# Patient Record
Sex: FEMALE | Race: BLACK OR AFRICAN AMERICAN | NOT HISPANIC OR LATINO | URBAN - METROPOLITAN AREA
[De-identification: names, ages, dates, MRNs, and addresses within clinical notes are randomized per-mention and may not be internally consistent; named-entity substitution may affect disease eponyms.]

---

## 2018-10-08 ENCOUNTER — EMERGENCY (EMERGENCY)
Facility: HOSPITAL | Age: 43
LOS: 1 days | Discharge: HOME | End: 2018-10-08
Attending: EMERGENCY MEDICINE | Admitting: EMERGENCY MEDICINE
Payer: COMMERCIAL

## 2018-10-08 VITALS
SYSTOLIC BLOOD PRESSURE: 108 MMHG | DIASTOLIC BLOOD PRESSURE: 78 MMHG | TEMPERATURE: 98 F | HEART RATE: 62 BPM | RESPIRATION RATE: 18 BRPM | OXYGEN SATURATION: 100 %

## 2018-10-08 VITALS
OXYGEN SATURATION: 100 % | HEART RATE: 70 BPM | TEMPERATURE: 98 F | RESPIRATION RATE: 16 BRPM | SYSTOLIC BLOOD PRESSURE: 110 MMHG | DIASTOLIC BLOOD PRESSURE: 82 MMHG

## 2018-10-08 LAB
ALBUMIN SERPL ELPH-MCNC: 4.3 G/DL — SIGNIFICANT CHANGE UP (ref 3.3–5)
ALP SERPL-CCNC: 71 U/L — SIGNIFICANT CHANGE UP (ref 40–120)
ALT FLD-CCNC: 18 U/L — SIGNIFICANT CHANGE UP (ref 4–33)
APTT BLD: 28.5 SEC — SIGNIFICANT CHANGE UP (ref 27.5–37.4)
AST SERPL-CCNC: 31 U/L — SIGNIFICANT CHANGE UP (ref 4–32)
BASOPHILS # BLD AUTO: 0.05 K/UL — SIGNIFICANT CHANGE UP (ref 0–0.2)
BASOPHILS NFR BLD AUTO: 0.5 % — SIGNIFICANT CHANGE UP (ref 0–2)
BILIRUB SERPL-MCNC: 0.5 MG/DL — SIGNIFICANT CHANGE UP (ref 0.2–1.2)
BUN SERPL-MCNC: 9 MG/DL — SIGNIFICANT CHANGE UP (ref 7–23)
CALCIUM SERPL-MCNC: 9.2 MG/DL — SIGNIFICANT CHANGE UP (ref 8.4–10.5)
CHLORIDE SERPL-SCNC: 103 MMOL/L — SIGNIFICANT CHANGE UP (ref 98–107)
CO2 SERPL-SCNC: 22 MMOL/L — SIGNIFICANT CHANGE UP (ref 22–31)
CREAT SERPL-MCNC: 0.76 MG/DL — SIGNIFICANT CHANGE UP (ref 0.5–1.3)
D DIMER BLD IA.RAPID-MCNC: 250 NG/ML — SIGNIFICANT CHANGE UP
EOSINOPHIL # BLD AUTO: 0.22 K/UL — SIGNIFICANT CHANGE UP (ref 0–0.5)
EOSINOPHIL NFR BLD AUTO: 2.4 % — SIGNIFICANT CHANGE UP (ref 0–6)
GLUCOSE SERPL-MCNC: 81 MG/DL — SIGNIFICANT CHANGE UP (ref 70–99)
HCT VFR BLD CALC: 35.5 % — SIGNIFICANT CHANGE UP (ref 34.5–45)
HGB BLD-MCNC: 12.2 G/DL — SIGNIFICANT CHANGE UP (ref 11.5–15.5)
IMM GRANULOCYTES # BLD AUTO: 0.03 # — SIGNIFICANT CHANGE UP
IMM GRANULOCYTES NFR BLD AUTO: 0.3 % — SIGNIFICANT CHANGE UP (ref 0–1.5)
INR BLD: 1.13 — SIGNIFICANT CHANGE UP (ref 0.88–1.17)
LYMPHOCYTES # BLD AUTO: 2.73 K/UL — SIGNIFICANT CHANGE UP (ref 1–3.3)
LYMPHOCYTES # BLD AUTO: 29.2 % — SIGNIFICANT CHANGE UP (ref 13–44)
MCHC RBC-ENTMCNC: 26.7 PG — LOW (ref 27–34)
MCHC RBC-ENTMCNC: 34.4 % — SIGNIFICANT CHANGE UP (ref 32–36)
MCV RBC AUTO: 77.7 FL — LOW (ref 80–100)
MONOCYTES # BLD AUTO: 0.98 K/UL — HIGH (ref 0–0.9)
MONOCYTES NFR BLD AUTO: 10.5 % — SIGNIFICANT CHANGE UP (ref 2–14)
NEUTROPHILS # BLD AUTO: 5.33 K/UL — SIGNIFICANT CHANGE UP (ref 1.8–7.4)
NEUTROPHILS NFR BLD AUTO: 57.1 % — SIGNIFICANT CHANGE UP (ref 43–77)
NRBC # FLD: 0 — SIGNIFICANT CHANGE UP
PLATELET # BLD AUTO: 229 K/UL — SIGNIFICANT CHANGE UP (ref 150–400)
PMV BLD: 10.3 FL — SIGNIFICANT CHANGE UP (ref 7–13)
POTASSIUM SERPL-MCNC: 4.1 MMOL/L — SIGNIFICANT CHANGE UP (ref 3.5–5.3)
POTASSIUM SERPL-SCNC: 4.1 MMOL/L — SIGNIFICANT CHANGE UP (ref 3.5–5.3)
PROT SERPL-MCNC: 7.7 G/DL — SIGNIFICANT CHANGE UP (ref 6–8.3)
PROTHROM AB SERPL-ACNC: 12.6 SEC — SIGNIFICANT CHANGE UP (ref 9.8–13.1)
RBC # BLD: 4.57 M/UL — SIGNIFICANT CHANGE UP (ref 3.8–5.2)
RBC # FLD: 13.3 % — SIGNIFICANT CHANGE UP (ref 10.3–14.5)
SODIUM SERPL-SCNC: 139 MMOL/L — SIGNIFICANT CHANGE UP (ref 135–145)
TROPONIN T, HIGH SENSITIVITY: < 6 NG/L — SIGNIFICANT CHANGE UP (ref ?–14)
WBC # BLD: 9.34 K/UL — SIGNIFICANT CHANGE UP (ref 3.8–10.5)
WBC # FLD AUTO: 9.34 K/UL — SIGNIFICANT CHANGE UP (ref 3.8–10.5)

## 2018-10-08 PROCEDURE — 71275 CT ANGIOGRAPHY CHEST: CPT | Mod: 26

## 2018-10-08 PROCEDURE — 93010 ELECTROCARDIOGRAM REPORT: CPT

## 2018-10-08 PROCEDURE — 99284 EMERGENCY DEPT VISIT MOD MDM: CPT | Mod: 25

## 2018-10-08 PROCEDURE — 93970 EXTREMITY STUDY: CPT | Mod: 26

## 2018-10-08 RX ORDER — ASPIRIN/CALCIUM CARB/MAGNESIUM 324 MG
162 TABLET ORAL ONCE
Qty: 0 | Refills: 0 | Status: COMPLETED | OUTPATIENT
Start: 2018-10-08 | End: 2018-10-08

## 2018-10-08 RX ADMIN — Medication 162 MILLIGRAM(S): at 15:27

## 2018-10-08 NOTE — ED PROVIDER NOTE - PHYSICAL EXAMINATION
RLE > LLE  R calf with tenderness  no reproducible chest pain or rash RLE > LLE  R calf with tenderness  no reproducible chest pain or rash    Kelsie:  minimal edema to RLE - lungs CTAB

## 2018-10-08 NOTE — ED PROVIDER NOTE - CARE PLAN
Principal Discharge DX:	Chest pain  Assessment and plan of treatment:	Continue all previously prescribed medications as directed.  Follow up with your primary care physician in 48-72 hours- bring copies of your results. You will need a repeat Ultrasound of your legs in 1 week. You will also need to follow up with your Primary Care / Pulmonologist for Lung Nodules as discussed for repeat imaging. Please also follow up with Gynecologist for Mammogram study and dedicated breast imaging as discussed.  Return to the Emergency Department for fevers, trouble breathing, shortness of breath, worsening or persistent symptoms OR ANY NEW OR CONCERNING SYMPTOMS.

## 2018-10-08 NOTE — ED PROVIDER NOTE - MEDICAL DECISION MAKING DETAILS
43 y.o female pmhx of DVT last treated in 2014, on daily baby aspirin coming in with 1 week of intermittent R sided chest discomfort and RLE pain. Will obtain EKG, labs, ddimer, coags, Duplex. 43 y.o female pmhx of DVT last treated in 2014, on daily baby aspirin coming in with 1 week of intermittent R sided chest discomfort and RLE pain. Will obtain EKG, labs, ddimer, coags, Duplex, CTA.

## 2018-10-08 NOTE — ED PROVIDER NOTE - OBJECTIVE STATEMENT
43 y.o female pmhx of DVT last treated in 2014, on daily baby aspirin coming in with 1 week of intermittent R sided chest discomfort that worsened this morning and radiated to her R shoulder.  Pain is not worsened by movement, or inspiration. Also has noticed a "pulling" sensation in her R leg over the past week Not on OCP. States has had a dry cough, worse when at night time for the past week, non productive. Denies fevers, chills, headache, dizziness, SOB, palpitations, abdominal pain, numbness, tingling, weakness, urinary symptoms. 43 y.o female pmhx of DVT last treated in 2014, on daily baby aspirin coming in with 1 week of intermittent R sided chest discomfort that worsened this morning and radiated to her R shoulder.  Pain is not worsened by movement, or inspiration. Also has noticed a "pulling" sensation in her R leg over the past week Not on OCP. States has had a dry cough, worse when at night time for the past week, non productive. Denies fevers, chills, headache, dizziness, SOB, palpitations, abdominal pain, numbness, tingling, weakness, urinary symptoms.  Only mild CP @ time of exam.  No recent travel, hemoptysis.

## 2018-10-08 NOTE — ED PROVIDER NOTE - PROGRESS NOTE DETAILS
piotr hernandez: imaging negative for DVT/PE. Trop < 6.  discussed all lab reports and imaging with patient including incidental findings- also that she will need to follow up with PMD for repeat Duplex US. Also will need to see pulmonologist and GYN for mammogram. pt agrees and understands. return precautions given.

## 2018-10-08 NOTE — ED PROVIDER NOTE - PLAN OF CARE
Continue all previously prescribed medications as directed.  Follow up with your primary care physician in 48-72 hours- bring copies of your results. You will need a repeat Ultrasound of your legs in 1 week. You will also need to follow up with your Primary Care / Pulmonologist for Lung Nodules as discussed for repeat imaging. Please also follow up with Gynecologist for Mammogram study and dedicated breast imaging as discussed.  Return to the Emergency Department for fevers, trouble breathing, shortness of breath, worsening or persistent symptoms OR ANY NEW OR CONCERNING SYMPTOMS.

## 2018-10-08 NOTE — ED PROVIDER NOTE - ATTENDING CONTRIBUTION TO CARE
I, Rodriguez Iglesias MD, personally saw the patient with ACP.  I have personally performed a face to face diagnostic evaluation on this patient.  I have reviewed the ACP note and agree with the history, exam, and plan of care, except as noted.    Pt w/ Hx of DVT now w/ RLE pain and minimal edema associated w/ CP and elevated ddimer.  Will check trop (heart <4 pending CE).  CTA/US and reeval